# Patient Record
Sex: FEMALE | Race: WHITE | NOT HISPANIC OR LATINO | URBAN - METROPOLITAN AREA
[De-identification: names, ages, dates, MRNs, and addresses within clinical notes are randomized per-mention and may not be internally consistent; named-entity substitution may affect disease eponyms.]

---

## 2023-05-12 ENCOUNTER — TRANSCRIBE ORDERS (OUTPATIENT)
Dept: ADMINISTRATIVE | Facility: HOSPITAL | Age: 38
End: 2023-05-12

## 2023-05-12 DIAGNOSIS — Z12.31 ENCOUNTER FOR SCREENING MAMMOGRAM FOR MALIGNANT NEOPLASM OF BREAST: Primary | ICD-10-CM

## 2023-08-23 ENCOUNTER — HOSPITAL ENCOUNTER (OUTPATIENT)
Dept: MAMMOGRAPHY | Facility: HOSPITAL | Age: 38
Discharge: HOME | End: 2023-08-23
Admitting: NURSE PRACTITIONER
Payer: COMMERCIAL

## 2023-08-23 DIAGNOSIS — Z12.31 ENCOUNTER FOR SCREENING MAMMOGRAM FOR MALIGNANT NEOPLASM OF BREAST: ICD-10-CM

## 2023-08-23 PROCEDURE — 77063 BREAST TOMOSYNTHESIS BI: CPT

## 2023-08-23 PROCEDURE — 77067 SCR MAMMO BI INCL CAD: CPT

## 2025-06-18 ENCOUNTER — OFFICE VISIT (OUTPATIENT)
Dept: OBSTETRICS AND GYNECOLOGY | Facility: CLINIC | Age: 40
End: 2025-06-18
Payer: COMMERCIAL

## 2025-06-18 VITALS
WEIGHT: 118 LBS | BODY MASS INDEX: 20.14 KG/M2 | DIASTOLIC BLOOD PRESSURE: 90 MMHG | SYSTOLIC BLOOD PRESSURE: 146 MMHG | HEIGHT: 64 IN

## 2025-06-18 DIAGNOSIS — Z01.419 WELL WOMAN EXAM: Primary | ICD-10-CM

## 2025-06-18 DIAGNOSIS — Z12.31 SCREENING MAMMOGRAM FOR BREAST CANCER: ICD-10-CM

## 2025-06-18 DIAGNOSIS — R10.31 RIGHT LOWER QUADRANT ABDOMINAL PAIN: ICD-10-CM

## 2025-06-18 RX ORDER — DEXTROAMPHETAMINE SACCHARATE, AMPHETAMINE ASPARTATE, DEXTROAMPHETAMINE SULFATE AND AMPHETAMINE SULFATE 5; 5; 5; 5 MG/1; MG/1; MG/1; MG/1
TABLET ORAL
COMMUNITY

## 2025-06-18 RX ORDER — LISDEXAMFETAMINE DIMESYLATE 60 MG/1
60 CAPSULE ORAL DAILY
COMMUNITY
Start: 2023-05-17

## 2025-06-18 NOTE — PROGRESS NOTES
"Annual Well Woman Visit    Subjective   Chief Complaint   Patient presents with    Gynecologic Exam     Wants to discuss \"pinching feeling\" every month when she ovulates     Tara Garcia is a 39 y.o.  presenting to be seen for an annual well woman visit. She reports her only concern today is that she has a history of an ablation 12 years ago and she does not have bleeding/ cycles, though once a month she experiences a pinching but constant pain in her right mid abdomen. She is unsure if this is the time of ovulation or her cycle. It is not severe but she does wonder what the cause is. She has a history notable for endometriosis, for which she has undergone laparoscopic excision of endo, as well as a prior CS x 1 and a laparoscopic cholecystectomy. She denies any other OB/GYN concerns today. Denies abnormal vaginal discharge, itching or burning.    OB Hx:   OB History    Para Term  AB Living   2 2 2   2   SAB IAB Ectopic Molar Multiple Live Births        2      # Outcome Date GA Lbr Sarwat/2nd Weight Sex Type Anes PTL Lv   2 Term 07 39w3d  3856 g (8 lb 8 oz) F CS-LTranv   JOSE      Birth Comments: Face presentation   1 Term 05 38w0d  3544 g (7 lb 13 oz) M Vag-Spont   JOSE      Contraception: none  Pap smear: ~8 years ago, denies h/o abnormal  Mammogram: 2023, BI-RADS 1  Colonoscopy: N/A  DEXA Scan: N/A    Past Medical History:   Diagnosis Date    Endometriosis 2009    History of transfusion     PONV (postoperative nausea and vomiting)     Varicella      Past Surgical History:   Procedure Laterality Date     SECTION      DIAGNOSTIC LAPAROSCOPY      removal of endometriosis    ENDOMETRIAL ABLATION  2012    LAPAROSCOPIC CHOLECYSTECTOMY      WISDOM TOOTH EXTRACTION  2008     Family History   Problem Relation Age of Onset    Breast cancer Maternal Aunt 60     Social History     Tobacco Use    Smoking status: Never    Smokeless tobacco: Never   Vaping Use    Vaping " "status: Never Used   Substance Use Topics    Alcohol use: Yes     Comment: Maybe a drink twice a year    Drug use: Never     (Not in a hospital admission)    Patient has no known allergies.  Current Outpatient Medications on File Prior to Visit   Medication Sig Dispense Refill    lisdexamfetamine (VYVANSE) 60 MG capsule Take 1 capsule by mouth Daily      amphetamine-dextroamphetamine (ADDERALL) 20 MG tablet TAKE ONE TABLET BY MOUTH EVERY DAY AT NOON FOR ADHD      Cholecalciferol 125 MCG (5000 UT) tablet Take 1 tablet by mouth Daily.       No current facility-administered medications on file prior to visit.     Social History    Tobacco Use      Smoking status: Never      Smokeless tobacco: Never    Review of Systems  Pertinent items are noted in HPI, all other systems were reviewed and negative       Objective   /90   Ht 162.6 cm (64\")   Wt 53.5 kg (118 lb)   BMI 20.25 kg/m²     Physical Exam:  General Appearance: alert, interactive, and NAD  Breasts:   Examined in supine position  Symmetric without hard/ fixed masses or skin dimpling; R lateral breast with fibrous/ cystic tissue that is mobile, non-tender  Nipples normal without inversion, lesions or discharge  There are no palpable axillary nodes  Abdomen: no masses, soft, non-tender, no guarding and no rebound tenderness  Pelvis:  Pelvic: Clinical staff was present for exam  External genitalia:  normal appearance of the external genitalia including Bartholin's and Chaparral's glands  :  urethral meatus normal  Vagina:  normal pink mucosa without lesions  Cervix:  no lesions, os appears stenotic  Uterus:  normal size, shape and consistency, non-tender  Adnexa:  normal bimanual exam of the adnexa, no masses or tenderness       Assessment & Plan    Annual well woman exam with age appropriate screening      Diagnosis Plan   1. Well woman exam  LIQUID-BASED PAP SMEAR WITH HPV GENOTYPING REGARDLESS OF INTERPRETATION (MARIE,COR,MAD)      2. Screening mammogram for " breast cancer  Mammo Screening Digital Tomosynthesis Bilateral With CAD      3. Right lower quadrant abdominal pain  US Non-ob Transvaginal        Medications ordered: None    Procedures performed: Pap    - Mammogram: Ordered  - Pap screening guidelines reviewed; pap smear completed today  - Healthy diet and exercise encouraged  - Contraception: none - aware that ablation is not contraception on its own  - Screening: none  - Cyclic R sided abdominal pain: TVS ordered, will call with results. If negative, will plan for observation.    Follow up for TVS/ with concerns.    Jennifer Cruz MD  Obstetrics and Gynecology  [facility]

## 2025-06-23 LAB — REF LAB TEST METHOD: NORMAL

## 2025-06-25 ENCOUNTER — TELEPHONE (OUTPATIENT)
Dept: OBSTETRICS AND GYNECOLOGY | Facility: CLINIC | Age: 40
End: 2025-06-25

## 2025-06-25 RX ORDER — NORETHINDRONE 5 MG/1
5 TABLET ORAL DAILY
Qty: 90 TABLET | Refills: 3 | Status: SHIPPED | OUTPATIENT
Start: 2025-06-25

## 2025-06-25 NOTE — TELEPHONE ENCOUNTER
Called patient to review US performed today - no acute pathology present/ no findings to explain her R sided abdominal pain. She does feel that the pain is cyclic/ monthly, and we discussed the possibility of endometriosis. Would recommend a trial of low dose hormonal therapy to see if pain improves. If pain does respond, could either continue medication or consider diagnostic laparoscopy/ surgical management.     Aygestin 5mg QD sent to pharmacy and all questions answered. Tara was encouraged to call/ return to clinic with any concerning side effects or ongoing pain.    Jennifer Cruz MD   Obstetrics and Gynecology  [facility]